# Patient Record
Sex: MALE | Race: WHITE | NOT HISPANIC OR LATINO
[De-identification: names, ages, dates, MRNs, and addresses within clinical notes are randomized per-mention and may not be internally consistent; named-entity substitution may affect disease eponyms.]

---

## 2022-06-17 ENCOUNTER — APPOINTMENT (OUTPATIENT)
Dept: ORTHOPEDIC SURGERY | Facility: CLINIC | Age: 72
End: 2022-06-17
Payer: MEDICARE

## 2022-06-17 VITALS — HEIGHT: 70 IN | WEIGHT: 180 LBS | BODY MASS INDEX: 25.77 KG/M2

## 2022-06-17 PROBLEM — Z00.00 ENCOUNTER FOR PREVENTIVE HEALTH EXAMINATION: Status: ACTIVE | Noted: 2022-06-17

## 2022-06-17 PROCEDURE — 99213 OFFICE O/P EST LOW 20 MIN: CPT | Mod: 25

## 2022-06-17 PROCEDURE — 73080 X-RAY EXAM OF ELBOW: CPT | Mod: LT

## 2022-06-17 PROCEDURE — 20606 DRAIN/INJ JOINT/BURSA W/US: CPT

## 2022-06-17 NOTE — PROCEDURE
[Other: ____] : [unfilled] [Pain] : pain [Alcohol] : alcohol [Ethyl Chloride sprayed topically] : ethyl chloride sprayed topically [Sterile technique used] : sterile technique used [] : Patient tolerated procedure well [Risks, benefits, alternatives discussed / Verbal consent obtained] : the risks benefits, and alternatives have been discussed, and verbal consent was obtained [Cyst aspiration] : cyst aspiration [All ultrasound images have been permanently captured and stored accordingly in our picture archiving and communication system] : All ultrasound images have been permanently captured and stored accordingly in our picture archiving and communication system [Visualization of the needle and placement of injection was performed without complication] : visualization of the needle and placement of injection was performed without complication

## 2022-06-17 NOTE — PHYSICAL EXAM
[] : moderate olecranon bursal effusion [Left] : left elbow [No acute displaced fracture or dislocation] : No acute displaced fracture or dislocation

## 2022-06-20 ENCOUNTER — APPOINTMENT (OUTPATIENT)
Dept: ORTHOPEDIC SURGERY | Facility: CLINIC | Age: 72
End: 2022-06-20
Payer: MEDICARE

## 2022-06-20 VITALS — HEIGHT: 70 IN | WEIGHT: 180 LBS | BODY MASS INDEX: 25.77 KG/M2

## 2022-06-20 DIAGNOSIS — E11.9 TYPE 2 DIABETES MELLITUS W/OUT COMPLICATIONS: ICD-10-CM

## 2022-06-20 DIAGNOSIS — Z78.9 OTHER SPECIFIED HEALTH STATUS: ICD-10-CM

## 2022-06-20 PROCEDURE — 20606 DRAIN/INJ JOINT/BURSA W/US: CPT

## 2022-06-20 PROCEDURE — 99213 OFFICE O/P EST LOW 20 MIN: CPT | Mod: 25

## 2022-06-20 RX ORDER — AMOXICILLIN AND CLAVULANATE POTASSIUM 875; 125 MG/1; MG/1
875-125 TABLET, COATED ORAL
Qty: 20 | Refills: 0 | Status: ACTIVE | COMMUNITY
Start: 2022-06-20 | End: 1900-01-01

## 2022-06-20 NOTE — HISTORY OF PRESENT ILLNESS
[Left Arm] : left arm [8] : 8 [0] : 0 [Dull/Aching] : dull/aching [Localized] : localized [Tightness] : tightness [Intermittent] : intermittent [Leisure] : leisure [Sleep] : sleep [Rest] : rest [Lying in bed] : lying in bed [] : Post Surgical Visit: no [FreeTextEntry1] : elbow [FreeTextEntry5] : 6/17 swelling post l elbow. on cefadroxil. [de-identified] : activity/pressure [de-identified] : 6/17/22 [de-identified] :

## 2022-06-25 LAB — BACTERIA FLD CULT: NORMAL

## 2022-06-30 ENCOUNTER — LABORATORY RESULT (OUTPATIENT)
Age: 72
End: 2022-06-30

## 2022-06-30 ENCOUNTER — APPOINTMENT (OUTPATIENT)
Dept: ORTHOPEDIC SURGERY | Facility: CLINIC | Age: 72
End: 2022-06-30

## 2022-06-30 VITALS — BODY MASS INDEX: 25.77 KG/M2 | HEIGHT: 70 IN | WEIGHT: 180 LBS

## 2022-06-30 DIAGNOSIS — M25.522 PAIN IN LEFT ELBOW: ICD-10-CM

## 2022-06-30 PROCEDURE — 99214 OFFICE O/P EST MOD 30 MIN: CPT | Mod: 25

## 2022-06-30 PROCEDURE — 20605 DRAIN/INJ JOINT/BURSA W/O US: CPT | Mod: LT

## 2022-06-30 RX ORDER — AMOXICILLIN AND CLAVULANATE POTASSIUM 875; 125 MG/1; MG/1
875-125 TABLET, COATED ORAL
Qty: 14 | Refills: 0 | Status: ACTIVE | COMMUNITY
Start: 2022-06-30 | End: 1900-01-01

## 2022-06-30 NOTE — HISTORY OF PRESENT ILLNESS
[6] : 6 [0] : 0 [de-identified] : 6/30/22:  acute onset of left elbow swelling since mid june 2022.   had olecranon bursa aspirated by dr poon on 6/17 as well as 6/20.  he was put on cefadroxil earlier this month and then put on augmentin on 6/20/22.   patient reports the swelling is getting better but still some there.  he is now noticing redness which started on 6/27/22.   no fevers or chills.  [] : no [FreeTextEntry1] : left elbow [de-identified] : 6/20/22 [de-identified] : Dr. Cullen

## 2022-06-30 NOTE — ASSESSMENT
[FreeTextEntry1] : acute onset of left elbow pain and swelling since mid june 2022.  no injury.   no fevers or chills.   had bursa aspirated twice by dr poon (on 6/17 and then again on 6/20).  recent aspirate on 6/20/22 negative for growth.  he was put on cefadroxil mid june 2022 by his pcp and then put on augmentin on 6/20 by dr poon.  some improvement but redness present.  aspirate negative but clinically septic bursa.  reaspirated today (may be false negative culture) \par \par on coumadin for afib\par also dm - last bs was 140\par

## 2022-06-30 NOTE — PROCEDURE
[Left] : of the left [Olecranon Bursae] : olecranon bursae [Pain] : pain [Inflammation] : inflammation [Alcohol] : alcohol [Betadine] : betadine [Other: ___] : [unfilled] [Cell count/dif] : cell count/dif [GS] : GS [Culture] : culture [Crystals] : crystals [] : Patient tolerated procedure well [Call if redness, pain or fever occur] : call if redness, pain or fever occur [Apply ice for 15min out of every hour for the next 12-24 hours as tolerated] : apply ice for 15 minutes out of every hour for the next 12-24 hours as tolerated [Patient was advised to rest the joint(s) for ____ days] : patient was advised to rest the joint(s) for [unfilled] days [Previous OTC use and PT nontherapeutic] : patient has tried OTC's including aspirin, Ibuprofen, Aleve, etc or prescription NSAIDS, and/or exercises at home and/or physical therapy without satisfactory response [Patient had decreased mobility in the joint] : patient had decreased mobility in the joint [Risks, benefits, alternatives discussed / Verbal consent obtained] : the risks benefits, and alternatives have been discussed, and verbal consent was obtained [de-identified] : 5 cc.  serous,  some cloudiness

## 2022-06-30 NOTE — PHYSICAL EXAM
[NL (0)] : extension 0 degrees [NL (90)] : supination 90 degrees [] : light touch intact [Left] : left elbow [FreeTextEntry3] : mild erythema [de-identified] : ttp olecranon bursa [FreeTextEntry1] : posterior bone spur [TWNoteComboBox7] : flexion 140 degrees

## 2022-07-07 ENCOUNTER — APPOINTMENT (OUTPATIENT)
Dept: ORTHOPEDIC SURGERY | Facility: CLINIC | Age: 72
End: 2022-07-07

## 2022-07-07 VITALS — HEIGHT: 70 IN | BODY MASS INDEX: 25.77 KG/M2 | WEIGHT: 180 LBS

## 2022-07-07 DIAGNOSIS — M70.22 OLECRANON BURSITIS, LEFT ELBOW: ICD-10-CM

## 2022-07-07 PROCEDURE — 99214 OFFICE O/P EST MOD 30 MIN: CPT | Mod: 25

## 2022-07-07 PROCEDURE — 20605 DRAIN/INJ JOINT/BURSA W/O US: CPT

## 2022-07-07 RX ORDER — SULFAMETHOXAZOLE AND TRIMETHOPRIM 800; 160 MG/1; MG/1
800-160 TABLET ORAL TWICE DAILY
Qty: 14 | Refills: 0 | Status: ACTIVE | COMMUNITY
Start: 2022-07-07 | End: 1900-01-01

## 2022-07-07 RX ORDER — CEFPODOXIME PROXETIL 200 MG/1
200 TABLET, FILM COATED ORAL TWICE DAILY
Qty: 14 | Refills: 0 | Status: ACTIVE | COMMUNITY
Start: 2022-07-07 | End: 1900-01-01

## 2022-07-07 NOTE — HISTORY OF PRESENT ILLNESS
[6] : 6 [0] : 0 [de-identified] : 6/30/22:  acute onset of left elbow swelling since mid june 2022.   had olecranon bursa aspirated by dr poon on 6/17 as well as 6/20.  he was put on cefadroxil earlier this month and then put on augmentin on 6/20/22.   patient reports the swelling is getting better but still some there.  he is now noticing redness which started on 6/27/22.   no fevers or chills. \par 7/7/22: left elbow stable.  a tad better but not resolved. [FreeTextEntry1] : left elbow

## 2022-07-07 NOTE — ASSESSMENT
[FreeTextEntry1] : acute onset of left elbow pain and swelling since mid june 2022.  no injury.   no fevers or chills.   had bursa aspirated twice by dr poon (on 6/17 and then again on 6/20).  recent aspirate on 6/20/22 negative for growth.  he was put on cefadroxil mid june 2022 by his pcp and then put on augmentin on 6/20 by dr poon.  aspirate again negative for infection but all aspirates after abx started.  looks like stable septic olecranon bursitis.  minimal improvement from last week.  discussed options with patient. will change abx again to bactrim and vantin.  will send to ID to follow for dosage control and monitoring of kidney function.  patient knows if he gets worse he may need to go to ER.\par \par on coumadin for afib\par also dm - last bs was 140\par

## 2022-07-07 NOTE — PHYSICAL EXAM
[NL (0)] : extension 0 degrees [NL (90)] : supination 90 degrees [] : light touch intact [Left] : left elbow [FreeTextEntry3] : mild erythema [de-identified] : ttp olecranon bursa [FreeTextEntry1] : posterior bone spur [TWNoteComboBox7] : flexion 140 degrees

## 2022-07-07 NOTE — PROCEDURE
[Other: ____] : [unfilled] [Left] : of the left [Pain] : pain [Inflammation] : inflammation [Alcohol] : alcohol [Betadine] : betadine [Cell count/dif] : cell count/dif [GS] : GS [Culture] : culture [Crystals] : crystals [] : Patient tolerated procedure well [Call if redness, pain or fever occur] : call if redness, pain or fever occur [Apply ice for 15min out of every hour for the next 12-24 hours as tolerated] : apply ice for 15 minutes out of every hour for the next 12-24 hours as tolerated [Patient was advised to rest the joint(s) for ____ days] : patient was advised to rest the joint(s) for [unfilled] days [Previous OTC use and PT nontherapeutic] : patient has tried OTC's including aspirin, Ibuprofen, Aleve, etc or prescription NSAIDS, and/or exercises at home and/or physical therapy without satisfactory response [Patient had decreased mobility in the joint] : patient had decreased mobility in the joint [Risks, benefits, alternatives discussed / Verbal consent obtained] : the risks benefits, and alternatives have been discussed, and verbal consent was obtained [de-identified] : 5 [de-identified] : brownish fluid

## 2022-07-14 ENCOUNTER — APPOINTMENT (OUTPATIENT)
Dept: ORTHOPEDIC SURGERY | Facility: CLINIC | Age: 72
End: 2022-07-14

## 2023-03-22 ENCOUNTER — APPOINTMENT (OUTPATIENT)
Dept: ORTHOPEDIC SURGERY | Facility: CLINIC | Age: 73
End: 2023-03-22

## 2023-05-09 ENCOUNTER — APPOINTMENT (OUTPATIENT)
Dept: ORTHOPEDIC SURGERY | Facility: CLINIC | Age: 73
End: 2023-05-09